# Patient Record
Sex: MALE | Race: BLACK OR AFRICAN AMERICAN
[De-identification: names, ages, dates, MRNs, and addresses within clinical notes are randomized per-mention and may not be internally consistent; named-entity substitution may affect disease eponyms.]

---

## 2019-09-30 ENCOUNTER — HOSPITAL ENCOUNTER (EMERGENCY)
Dept: HOSPITAL 61 - ER | Age: 34
Discharge: HOME | End: 2019-09-30
Payer: SELF-PAY

## 2019-09-30 VITALS — WEIGHT: 200 LBS | HEIGHT: 70 IN | BODY MASS INDEX: 28.63 KG/M2

## 2019-09-30 VITALS — DIASTOLIC BLOOD PRESSURE: 80 MMHG | SYSTOLIC BLOOD PRESSURE: 114 MMHG

## 2019-09-30 DIAGNOSIS — R45.851: Primary | ICD-10-CM

## 2019-09-30 DIAGNOSIS — Z59.0: ICD-10-CM

## 2019-09-30 DIAGNOSIS — F12.20: ICD-10-CM

## 2019-09-30 DIAGNOSIS — F17.200: ICD-10-CM

## 2019-09-30 DIAGNOSIS — F15.20: ICD-10-CM

## 2019-09-30 DIAGNOSIS — F31.9: ICD-10-CM

## 2019-09-30 DIAGNOSIS — R53.1: ICD-10-CM

## 2019-09-30 PROCEDURE — 82962 GLUCOSE BLOOD TEST: CPT

## 2019-09-30 PROCEDURE — 99283 EMERGENCY DEPT VISIT LOW MDM: CPT

## 2019-09-30 SDOH — ECONOMIC STABILITY - HOUSING INSECURITY: HOMELESSNESS: Z59.0

## 2019-09-30 NOTE — PHYS DOC
Past Medical History


Past Medical History:  Bipolar, Schizophrenia


Past Surgical History:  No Surgical History


Additional Information:  


1 ppd


Alcohol Use:  Occasionally


Additional Information:  


Pt reports he drinks alcohol whenever he can get it


Drug Use:  Marijuana, Methamphetamine





Adult General


Chief Complaint


Chief Complaint:  ALTERED MENTAL STATUS





[facility]


HPI





Patient is a 34  year old male who was brought here by EMS after he was found 

sleeping outside on the sidewalk by the police. Patient nicely suicidal 

ideation, denies homicidal ideation. Patient said he did feel weak and tired. He

denies any abdominal pain, no nausea vomiting, no back pain.  He admitted of 

using methamphetamine and marijuana.  EMS report that when patient stood up he 

was feeling shaky BUT did not pass out.


aLL OTHER ros IS NEGATIVE UNLESS OTHERWISE NOTED IN hpi





Review of Systems


Review of Systems


See above





Allergies


Allergies





Allergies








Coded Allergies Type Severity Reaction Last Updated Verified


 


  No Known Drug Allergies    9/30/19 No











Physical Exam


Physical Exam


See above


Constitutional: Well developed, well nourished, no acute distress, non-toxic 

appearance. Patient is very somnolent, woke up to answer questions and then went

back to sleep. 


HENT: Normocephalic, atraumatic, bilateral external ears normal, oropharynx 

moist, no oral exudates, nose normal. []


Eyes: PERRLA, EOMI, conjunctiva normal, no discharge. [] 


Neck: Normal range of motion, no tenderness, supple, no stridor. [] 


Cardiovascular:Heart rate regular rhythm, no murmur []


Lungs & Thorax:  Bilateral breath sounds clear to auscultation []


Abdomen: Bowel sounds normal, soft, no tenderness, no masses, no pulsatile missy

s. [] 


Skin: Warm, dry, no erythema, no rash. [] 


Back: No tenderness, no CVA tenderness. [] 


Extremities: No tenderness, no cyanosis, no clubbing, ROM intact, no edema.  NO 

SIGNS OF TRAUMA.  


Neurologic: Alert and oriented X 3, normal motor function, normal sensory 

function, no focal deficits noted.  Appeared very sleepy but normal speech,  

moved all extremities.


Psychologic: Affect normal, judgement normal, mood normal. []





Current Patient Data


Vital Signs





                                   Vital Signs








  Date Time  Temp Pulse Resp B/P (MAP) Pulse Ox O2 Delivery O2 Flow Rate FiO2


 


9/30/19 09:57 98.6 92 16 143/79 (100) 100 Room Air  





 98.6       








Lab Values





                                Laboratory Tests








Test


 9/30/19


10:59


 


Glucose (Fingerstick)


 108 mg/dL


(70-99)  H











EKG


EKG


[]





Radiology/Procedures


Radiology/Procedures


[]





Course & Med Decision Making


Course & Med Decision Making


Pertinent Labs and Imaging studies reviewed. (See chart for details)





Patient was sleeping here.  He was given food to eat here.  He became more 

awake, alert.  Denied suicidal ideation or homicidal ideation.





Dragon Disclaimer


Dragon Disclaimer


This electronic medical record was generated, in whole or in part, using a voice

 recognition dictation system.





Departure


Departure


Impression:  


   Primary Impression:  


   Encounter for medical screening examination


   Additional Impressions:  


   Homeless single person


   Weakness


Disposition:  01 HOME, SELF-CARE


Condition:  STABLE


Patient Instructions:  Weakness


Scripts


No Active Prescriptions or Reported Meds





Problem Qualifiers











TON SHAIKH DO                Sep 30, 2019 10:20